# Patient Record
Sex: FEMALE | Race: WHITE | Employment: FULL TIME | URBAN - METROPOLITAN AREA
[De-identification: names, ages, dates, MRNs, and addresses within clinical notes are randomized per-mention and may not be internally consistent; named-entity substitution may affect disease eponyms.]

---

## 2020-10-24 ENCOUNTER — HOSPITAL ENCOUNTER (OUTPATIENT)
Age: 60
Setting detail: OBSERVATION
Discharge: HOME | End: 2020-10-26
Attending: EMERGENCY MEDICINE | Admitting: INTERNAL MEDICINE
Payer: COMMERCIAL

## 2020-10-24 ENCOUNTER — APPOINTMENT (OUTPATIENT)
Dept: CT IMAGING | Age: 60
End: 2020-10-24
Payer: COMMERCIAL

## 2020-10-24 ENCOUNTER — APPOINTMENT (OUTPATIENT)
Dept: GENERAL RADIOLOGY | Age: 60
End: 2020-10-24
Payer: COMMERCIAL

## 2020-10-24 PROBLEM — E86.0 DEHYDRATION: Status: ACTIVE | Noted: 2020-10-24

## 2020-10-24 PROBLEM — E11.9 TYPE 2 DIABETES MELLITUS, WITHOUT LONG-TERM CURRENT USE OF INSULIN (HCC): Status: ACTIVE | Noted: 2020-10-24

## 2020-10-24 PROBLEM — R73.9 HYPERGLYCEMIA: Status: ACTIVE | Noted: 2020-10-24

## 2020-10-24 PROBLEM — R55 SYNCOPE AND COLLAPSE: Status: ACTIVE | Noted: 2020-10-24

## 2020-10-24 PROBLEM — E66.01 MORBID OBESITY WITH BMI OF 40.0-44.9, ADULT (HCC): Status: ACTIVE | Noted: 2020-10-24

## 2020-10-24 LAB
ANION GAP SERPL CALCULATED.3IONS-SCNC: 14 MMOL/L (ref 7–16)
BACTERIA: ABNORMAL /HPF
BASOPHILS ABSOLUTE: 0.05 E9/L (ref 0–0.2)
BASOPHILS RELATIVE PERCENT: 0.6 % (ref 0–2)
BILIRUBIN URINE: NEGATIVE
BLOOD, URINE: NEGATIVE
BUN BLDV-MCNC: 25 MG/DL (ref 8–23)
CALCIUM SERPL-MCNC: 9.2 MG/DL (ref 8.6–10.2)
CHLORIDE BLD-SCNC: 98 MMOL/L (ref 98–107)
CHP ED QC CHECK: YES
CLARITY: CLEAR
CO2: 24 MMOL/L (ref 22–29)
COLOR: YELLOW
CREAT SERPL-MCNC: 0.8 MG/DL (ref 0.5–1)
EKG ATRIAL RATE: 64 BPM
EKG P AXIS: 35 DEGREES
EKG P-R INTERVAL: 132 MS
EKG Q-T INTERVAL: 438 MS
EKG QRS DURATION: 84 MS
EKG QTC CALCULATION (BAZETT): 451 MS
EKG R AXIS: 48 DEGREES
EKG T AXIS: 23 DEGREES
EKG VENTRICULAR RATE: 64 BPM
EOSINOPHILS ABSOLUTE: 0.25 E9/L (ref 0.05–0.5)
EOSINOPHILS RELATIVE PERCENT: 3 % (ref 0–6)
GFR AFRICAN AMERICAN: >60
GFR NON-AFRICAN AMERICAN: >60 ML/MIN/1.73
GLUCOSE BLD-MCNC: 194 MG/DL
GLUCOSE BLD-MCNC: 194 MG/DL (ref 74–99)
GLUCOSE URINE: NEGATIVE MG/DL
HCT VFR BLD CALC: 40.6 % (ref 34–48)
HEMOGLOBIN: 13.2 G/DL (ref 11.5–15.5)
IMMATURE GRANULOCYTES #: 0.04 E9/L
IMMATURE GRANULOCYTES %: 0.5 % (ref 0–5)
KETONES, URINE: NEGATIVE MG/DL
LEUKOCYTE ESTERASE, URINE: ABNORMAL
LYMPHOCYTES ABSOLUTE: 2.48 E9/L (ref 1.5–4)
LYMPHOCYTES RELATIVE PERCENT: 29.4 % (ref 20–42)
MCH RBC QN AUTO: 30.3 PG (ref 26–35)
MCHC RBC AUTO-ENTMCNC: 32.5 % (ref 32–34.5)
MCV RBC AUTO: 93.3 FL (ref 80–99.9)
METER GLUCOSE: 113 MG/DL (ref 74–99)
METER GLUCOSE: 139 MG/DL (ref 74–99)
METER GLUCOSE: 194 MG/DL (ref 74–99)
MONOCYTES ABSOLUTE: 0.76 E9/L (ref 0.1–0.95)
MONOCYTES RELATIVE PERCENT: 9 % (ref 2–12)
NEUTROPHILS ABSOLUTE: 4.86 E9/L (ref 1.8–7.3)
NEUTROPHILS RELATIVE PERCENT: 57.5 % (ref 43–80)
NITRITE, URINE: NEGATIVE
PDW BLD-RTO: 12.9 FL (ref 11.5–15)
PH UA: 6 (ref 5–9)
PLATELET # BLD: 237 E9/L (ref 130–450)
PMV BLD AUTO: 8.8 FL (ref 7–12)
POTASSIUM SERPL-SCNC: 4.3 MMOL/L (ref 3.5–5)
PROTEIN UA: NEGATIVE MG/DL
RBC # BLD: 4.35 E12/L (ref 3.5–5.5)
RBC UA: ABNORMAL /HPF (ref 0–2)
SODIUM BLD-SCNC: 136 MMOL/L (ref 132–146)
SPECIFIC GRAVITY UA: 1.01 (ref 1–1.03)
TROPONIN: <0.01 NG/ML (ref 0–0.03)
UROBILINOGEN, URINE: 0.2 E.U./DL
WBC # BLD: 8.4 E9/L (ref 4.5–11.5)
WBC UA: ABNORMAL /HPF (ref 0–5)

## 2020-10-24 PROCEDURE — 87147 CULTURE TYPE IMMUNOLOGIC: CPT

## 2020-10-24 PROCEDURE — 93005 ELECTROCARDIOGRAM TRACING: CPT | Performed by: STUDENT IN AN ORGANIZED HEALTH CARE EDUCATION/TRAINING PROGRAM

## 2020-10-24 PROCEDURE — 81001 URINALYSIS AUTO W/SCOPE: CPT

## 2020-10-24 PROCEDURE — G0378 HOSPITAL OBSERVATION PER HR: HCPCS

## 2020-10-24 PROCEDURE — 85025 COMPLETE CBC W/AUTO DIFF WBC: CPT

## 2020-10-24 PROCEDURE — 71045 X-RAY EXAM CHEST 1 VIEW: CPT

## 2020-10-24 PROCEDURE — 99285 EMERGENCY DEPT VISIT HI MDM: CPT

## 2020-10-24 PROCEDURE — 70450 CT HEAD/BRAIN W/O DYE: CPT

## 2020-10-24 PROCEDURE — 96361 HYDRATE IV INFUSION ADD-ON: CPT

## 2020-10-24 PROCEDURE — 6370000000 HC RX 637 (ALT 250 FOR IP): Performed by: CLINICAL NURSE SPECIALIST

## 2020-10-24 PROCEDURE — 6360000002 HC RX W HCPCS: Performed by: STUDENT IN AN ORGANIZED HEALTH CARE EDUCATION/TRAINING PROGRAM

## 2020-10-24 PROCEDURE — 99244 OFF/OP CNSLTJ NEW/EST MOD 40: CPT | Performed by: INTERNAL MEDICINE

## 2020-10-24 PROCEDURE — 2580000003 HC RX 258: Performed by: CLINICAL NURSE SPECIALIST

## 2020-10-24 PROCEDURE — 84484 ASSAY OF TROPONIN QUANT: CPT

## 2020-10-24 PROCEDURE — 80048 BASIC METABOLIC PNL TOTAL CA: CPT

## 2020-10-24 PROCEDURE — 87186 SC STD MICRODIL/AGAR DIL: CPT

## 2020-10-24 PROCEDURE — 87088 URINE BACTERIA CULTURE: CPT

## 2020-10-24 PROCEDURE — 96374 THER/PROPH/DIAG INJ IV PUSH: CPT

## 2020-10-24 PROCEDURE — 82962 GLUCOSE BLOOD TEST: CPT

## 2020-10-24 PROCEDURE — 87077 CULTURE AEROBIC IDENTIFY: CPT

## 2020-10-24 PROCEDURE — 6360000002 HC RX W HCPCS: Performed by: CLINICAL NURSE SPECIALIST

## 2020-10-24 PROCEDURE — 96375 TX/PRO/DX INJ NEW DRUG ADDON: CPT

## 2020-10-24 PROCEDURE — 2580000003 HC RX 258: Performed by: STUDENT IN AN ORGANIZED HEALTH CARE EDUCATION/TRAINING PROGRAM

## 2020-10-24 PROCEDURE — 96372 THER/PROPH/DIAG INJ SC/IM: CPT

## 2020-10-24 RX ORDER — DEXTROSE MONOHYDRATE 25 G/50ML
12.5 INJECTION, SOLUTION INTRAVENOUS PRN
Status: DISCONTINUED | OUTPATIENT
Start: 2020-10-24 | End: 2020-10-26 | Stop reason: HOSPADM

## 2020-10-24 RX ORDER — DEXTROSE MONOHYDRATE 50 MG/ML
100 INJECTION, SOLUTION INTRAVENOUS PRN
Status: DISCONTINUED | OUTPATIENT
Start: 2020-10-24 | End: 2020-10-26 | Stop reason: HOSPADM

## 2020-10-24 RX ORDER — HYDROCHLOROTHIAZIDE 25 MG/1
25 TABLET ORAL DAILY
Status: DISCONTINUED | OUTPATIENT
Start: 2020-10-25 | End: 2020-10-26 | Stop reason: HOSPADM

## 2020-10-24 RX ORDER — ACETAMINOPHEN 325 MG/1
650 TABLET ORAL EVERY 6 HOURS PRN
Status: DISCONTINUED | OUTPATIENT
Start: 2020-10-24 | End: 2020-10-26 | Stop reason: HOSPADM

## 2020-10-24 RX ORDER — PROMETHAZINE HYDROCHLORIDE 25 MG/1
12.5 TABLET ORAL EVERY 6 HOURS PRN
Status: DISCONTINUED | OUTPATIENT
Start: 2020-10-24 | End: 2020-10-26

## 2020-10-24 RX ORDER — ACETAMINOPHEN 650 MG/1
650 SUPPOSITORY RECTAL EVERY 6 HOURS PRN
Status: DISCONTINUED | OUTPATIENT
Start: 2020-10-24 | End: 2020-10-26 | Stop reason: HOSPADM

## 2020-10-24 RX ORDER — M-VIT,TX,IRON,MINS/CALC/FOLIC 27MG-0.4MG
1 TABLET ORAL DAILY
COMMUNITY

## 2020-10-24 RX ORDER — SODIUM CHLORIDE 0.9 % (FLUSH) 0.9 %
10 SYRINGE (ML) INJECTION EVERY 12 HOURS SCHEDULED
Status: DISCONTINUED | OUTPATIENT
Start: 2020-10-24 | End: 2020-10-26 | Stop reason: HOSPADM

## 2020-10-24 RX ORDER — VALSARTAN AND HYDROCHLOROTHIAZIDE 320; 25 MG/1; MG/1
1 TABLET, FILM COATED ORAL DAILY
Status: DISCONTINUED | OUTPATIENT
Start: 2020-10-24 | End: 2020-10-24 | Stop reason: SDUPTHER

## 2020-10-24 RX ORDER — LORATADINE 10 MG/1
10 TABLET ORAL DAILY
COMMUNITY

## 2020-10-24 RX ORDER — NICOTINE POLACRILEX 4 MG
15 LOZENGE BUCCAL PRN
Status: DISCONTINUED | OUTPATIENT
Start: 2020-10-24 | End: 2020-10-26 | Stop reason: HOSPADM

## 2020-10-24 RX ORDER — METFORMIN HYDROCHLORIDE 500 MG/1
1000 TABLET, EXTENDED RELEASE ORAL
COMMUNITY

## 2020-10-24 RX ORDER — SODIUM CHLORIDE 0.9 % (FLUSH) 0.9 %
10 SYRINGE (ML) INJECTION PRN
Status: DISCONTINUED | OUTPATIENT
Start: 2020-10-24 | End: 2020-10-26 | Stop reason: HOSPADM

## 2020-10-24 RX ORDER — POLYETHYLENE GLYCOL 3350 17 G/17G
17 POWDER, FOR SOLUTION ORAL DAILY PRN
Status: DISCONTINUED | OUTPATIENT
Start: 2020-10-24 | End: 2020-10-26 | Stop reason: HOSPADM

## 2020-10-24 RX ORDER — 0.9 % SODIUM CHLORIDE 0.9 %
1000 INTRAVENOUS SOLUTION INTRAVENOUS ONCE
Status: COMPLETED | OUTPATIENT
Start: 2020-10-24 | End: 2020-10-24

## 2020-10-24 RX ORDER — VALSARTAN 320 MG/1
320 TABLET ORAL DAILY
Status: DISCONTINUED | OUTPATIENT
Start: 2020-10-25 | End: 2020-10-26 | Stop reason: HOSPADM

## 2020-10-24 RX ORDER — ONDANSETRON 2 MG/ML
4 INJECTION INTRAMUSCULAR; INTRAVENOUS EVERY 6 HOURS PRN
Status: DISCONTINUED | OUTPATIENT
Start: 2020-10-24 | End: 2020-10-26 | Stop reason: HOSPADM

## 2020-10-24 RX ADMIN — VERAPAMIL HYDROCHLORIDE 180 MG: 180 TABLET, FILM COATED, EXTENDED RELEASE ORAL at 16:35

## 2020-10-24 RX ADMIN — SODIUM CHLORIDE 1000 ML: 9 INJECTION, SOLUTION INTRAVENOUS at 10:21

## 2020-10-24 RX ADMIN — ENOXAPARIN SODIUM 40 MG: 40 INJECTION SUBCUTANEOUS at 16:35

## 2020-10-24 RX ADMIN — SODIUM CHLORIDE 1000 ML: 9 INJECTION, SOLUTION INTRAVENOUS at 08:03

## 2020-10-24 RX ADMIN — Medication 10 ML: at 20:41

## 2020-10-24 RX ADMIN — CEFTRIAXONE SODIUM 1 G: 1 INJECTION, POWDER, FOR SOLUTION INTRAMUSCULAR; INTRAVENOUS at 10:18

## 2020-10-24 ASSESSMENT — ENCOUNTER SYMPTOMS
ABDOMINAL PAIN: 0
WHEEZING: 0
RHINORRHEA: 0
SHORTNESS OF BREATH: 0
COUGH: 0
NAUSEA: 1
EYE PAIN: 0
VOMITING: 0
DIARRHEA: 1
PHOTOPHOBIA: 0

## 2020-10-24 ASSESSMENT — PAIN SCALES - GENERAL
PAINLEVEL_OUTOF10: 0
PAINLEVEL_OUTOF10: 0

## 2020-10-24 NOTE — ED NOTES
Bed: 06  Expected date:   Expected time:   Means of arrival:   Comments:  Yair Blackmon RN  10/24/20 0352

## 2020-10-24 NOTE — CONSULTS
Inpatient Cardiology Consultation      Reason for Consult: Syncope    Requesting Physician:  Dr. Nehal Scales    Date of Consultation: 10/24/2020    HISTORY OF PRESENT ILLNESS:   Very pleasant 55-year-old  female on whom we are consulted for syncope    She was in her usual state of health until this morning. She went to the bathroom to urinate. When she went back to bed she sat on the side of the bed and felt very lightheaded then passed out. She did not feel flushed or nauseated. She did not feel palpitations or chest pain or dyspnea. Her  tried to set her up later when she came around and she passed out again. This happened again on route to the hospital.  She refuses to get orthostatic vital signs checked for fear of passing out is again as she feels very lightheaded upon changing her body position or sitting up. Her twelve-lead EKG is entirely normal.  Her telemetry does not reveal any tacky arrhythmia or bradycardia arrhythmia. Her chest x-ray is normal.  Her initial troponin is negative. Her BMP is normal aside from a high BUN to creatinine ratio. Her urine is positive for pyuria. She has no angina during her activities of daily living. She denies orthopnea, lower extremity edema, PND, palpitations. She has never had syncope before. About a week ago she had an upper respiratory infection treated with over-the-counter meds. A focused history review includes:  1. BMI 42  2. Type 2 diabetes  3. Hypertension  4. Former smoker of 30 pack years-quit in 2006  5. No alcohol or recreational drugs  6. Family history negative for premature ASCVD and sudden death  7. Still works at an office    Please note: past medical records were reviewed per electronic medical record - see detailed reports under Past Medical/ Surgical History.    Past Medical History:    Past Medical History:   Diagnosis Date    Diabetes mellitus ([facility] [address]     2    Hypertension        Past Surgical History:    Past Surgical History:   Procedure Laterality Date    HYSTERECTOMY         Medications Prior to admit:  Prior to Admission medications    Medication Sig Start Date End Date Taking?  Authorizing Provider   metFORMIN (GLUCOPHAGE-XR) 500 MG extended release tablet Take 1,000 mg by mouth Daily with supper    Yes Historical Provider, MD   SITagliptin (JANUVIA) 25 MG tablet Take 25 mg by mouth daily   Yes Historical Provider, MD   Multiple Vitamins-Minerals (THERAPEUTIC MULTIVITAMIN-MINERALS) tablet Take 1 tablet by mouth daily   Yes Historical Provider, MD   loratadine (CLARITIN) 10 MG tablet Take 10 mg by mouth daily   Yes Historical Provider, MD   valsartan-hydrochlorothiazide (DIOVAN HCT) 320-25 MG per tablet Take 1 tablet by mouth daily   Yes Historical Provider, MD   verapamil (VERELAN) 180 MG extended release capsule Take 180 mg by mouth daily    Yes Historical Provider, MD       Current Medications:    Current Facility-Administered Medications: verapamil (CALAN SR) extended release tablet 180 mg, 180 mg, Oral, Daily  sodium chloride flush 0.9 % injection 10 mL, 10 mL, Intravenous, 2 times per day  sodium chloride flush 0.9 % injection 10 mL, 10 mL, Intravenous, PRN  acetaminophen (TYLENOL) tablet 650 mg, 650 mg, Oral, Q6H PRN **OR** acetaminophen (TYLENOL) suppository 650 mg, 650 mg, Rectal, Q6H PRN  polyethylene glycol (GLYCOLAX) packet 17 g, 17 g, Oral, Daily PRN  promethazine (PHENERGAN) tablet 12.5 mg, 12.5 mg, Oral, Q6H PRN **OR** ondansetron (ZOFRAN) injection 4 mg, 4 mg, Intravenous, Q6H PRN  enoxaparin (LOVENOX) injection 40 mg, 40 mg, Subcutaneous, Daily  insulin lispro (HUMALOG) injection vial 0-6 Units, 0-6 Units, Subcutaneous, TID WC  insulin lispro (HUMALOG) injection vial 0-3 Units, 0-3 Units, Subcutaneous, Nightly  glucose (GLUTOSE) 40 % oral gel 15 g, 15 g, Oral, PRN  dextrose 50 % IV solution, 12.5 g, Intravenous, PRN  glucagon (rDNA) injection 1 mg, 1 mg, Intramuscular, PRN  dextrose 5 % solution, 100 visual changes, or   Respiratory ROS: negative for - cough, hemoptysis, orthopnea, shortness of breath, wheezing, or   Cardiovascular ROS: see HPI  Gastrointestinal ROS: negative for - abdominal pain, blood in stools, constipation, diarrhea, heartburn, hematemesis, melena, nausea/vomiting, or   Genito-Urinary ROS: no dysuria, trouble voiding, or hematuria or   Musculoskeletal ROS: negative for - joint pain, joint swelling, muscle pain, muscular weakness, or   Neurological ROS: negative for - confusion, dizziness, headaches, impaired coordination/balance, memory loss, numbness/tingling, seizures, speech problems, visual changes, weakness, or    Dermatological ROS: negative for - hair changes, nail changes, pruritus, or rash    PHYSICAL EXAM:  /60   Pulse 60   Temp 96.9 °F (36.1 °C) (Temporal)   Resp 18   Ht 5' 1.5\" (1.562 m)   Wt 228 lb (103.4 kg)   SpO2 97%   BMI 42.38 kg/m²     General Appearance:    Alert, cooperative, no distress, appears stated age    Head:    Normocephalic, without obvious abnormality, atraumatic    Eyes:    PERRL, conjunctiva/corneas clear, EOM's intact    Neck:   Supple, symmetrical, trachea midline; no carotid    bruit or JVP    Lungs:     Clear to auscultation bilaterally, respirations unlabored, no wheezing, rales or rhonchi    Heart:    Regular rate and rhythm, no murmur, rub   or gallop    Abdomen:     Soft, non-tender, non-distended    Extremities:   Extremities normal, atraumatic, no cyanosis or edema    Skin:   Skin color, texture, turgor normal for age    Neurologic:   Oriented x3, CNII-XII intact.  Normal gross strength and sensation       DATA:    ECG / Tele strips: please see HPI       No intake or output data in the 24 hours ending 10/24/20 1507    Labs:   CBC:   Recent Labs     10/24/20  0806   WBC 8.4   HGB 13.2   HCT 40.6        BMP:   Recent Labs     10/24/20  0806      K 4.3   CO2 24   BUN 25*   CREATININE 0.8   LABGLOM >60   CALCIUM 9.2     Mag: No results for input(s): MG in the last 72 hours. Phos: No results for input(s): PHOS in the last 72 hours. TSH: No results for input(s): TSH in the last 72 hours. HgA1c: No results found for: LABA1C  No results found for: EAG  proBNP: No results for input(s): PROBNP in the last 72 hours. PT/INR: No results for input(s): PROTIME, INR in the last 72 hours. APTT:No results for input(s): APTT in the last 72 hours. CARDIAC ENZYMES:  Recent Labs     10/24/20  0806   TROPONINI <0.01     FASTING LIPID PANEL:No results found for: CHOL, HDL, LDLDIRECT, LDLCALC, TRIG  LIVER PROFILE:No results for input(s): AST, ALT, LABALBU in the last 72 hours. ASSESSMENT:  58-year-old  female with type 2 diabetes, hypertension who presents with syncope. Her syncope appears to be very positional such as sitting up or changing position in bed. She does not have vertigo. She does not have ACS or heart failure. She has no evidence of arrhythmia on twelve-lead EKG or telemetry since she has been in the hospital.  Her labs are unremarkable aside from a high BUN to creatinine ratio. The differential diagnosis includes vertebrobasilar insufficiency, arrhythmia, hypovolemia. I have low suspicion of structural heart disease (completely normal exam), PE or coronary ischemia. PLAN:   Limited TTE for LV function and RV function   Agree with carotid ultrasound. Low threshold for CTA or MRA of the head and neck   If no significant carotid or vertebral disease then recommend an ENT consult   If work-up is negative then recommend a 30-day event monitor on discharge   Follow-up with me in the office in 4 weeks    We will follow this patient peripherally. Please call us with any questions or concerns. Thank you for the consult.     Sunny Bruce MD  Interventional Cardiology/Structural Heart Disease  Cell: [phone]       Electronically signed by Sunny Bruce MD on 10/24/2020 at 3:07 PM

## 2020-10-24 NOTE — ED PROVIDER NOTES
Jamia Pierson is a 61 y.o. female presenting to the ED for dizziness, syncope. Patient was brought by EMS and reports that patient in route was set up in the bed and syncopized for 10 seconds. She currently is alert and oriented in room now. Patient reports that this morning she got up went to the bathroom and came back to her bed and then sat up and felt dizzy. Her dizziness described as lightheaded and she felt nauseous at that time. She denies any history of similar presentation. She has history of diabetes and her sugars this morning when she woke up around 230. She admits to a dry mouth. The complaint has been intermittent, moderate in severity, and worsened by sitting up. Their complaint is made better by nothing. Patient has a medical history as listed below:   Past Medical History:   Diagnosis Date    Diabetes mellitus ([facility] [address]     2    Hypertension      Patient Active Problem List    Diagnosis Date Noted    Dehydration 10/24/2020    Morbid obesity with BMI of 40.0-44.9, adult ([facility] [address] 10/24/2020    Type 2 diabetes mellitus, without long-term current use of insulin ([facility] [address] 10/24/2020    Syncope and collapse 10/24/2020             Review of Systems   Constitutional: Negative for chills and fever. HENT: Negative for congestion and rhinorrhea. Dry mouth   Eyes: Positive for visual disturbance (Darkening of vision before she syncopized). Negative for photophobia and pain. Respiratory: Negative for cough, shortness of breath and wheezing. Cardiovascular: Negative for chest pain, palpitations and leg swelling. Gastrointestinal: Positive for diarrhea and nausea. Negative for abdominal pain and vomiting. Genitourinary: Negative for dysuria, frequency and hematuria. Skin: Negative. Neurological: Positive for dizziness, syncope and light-headedness. Negative for facial asymmetry, weakness, numbness and headaches. Hematological: Negative. Psychiatric/Behavioral: Negative. Physical Exam  Vitals signs and nursing note reviewed. Constitutional:       Appearance: She is well-developed. HENT:      Head: Normocephalic and atraumatic. Right Ear: Tympanic membrane, ear canal and external ear normal.      Left Ear: Tympanic membrane, ear canal and external ear normal.      Nose: Nose normal.      Mouth/Throat:      Mouth: Mucous membranes are dry. Pharynx: No oropharyngeal exudate or posterior oropharyngeal erythema. Eyes:      Extraocular Movements: Extraocular movements intact. Conjunctiva/sclera: Conjunctivae normal.      Pupils: Pupils are equal, round, and reactive to light. Neck:      Musculoskeletal: Normal range of motion. Vascular: No JVD. Cardiovascular:      Rate and Rhythm: Normal rate and regular rhythm. Pulses: Normal pulses. Heart sounds: Normal heart sounds. Pulmonary:      Effort: Pulmonary effort is normal. No respiratory distress. Breath sounds: Normal breath sounds. No wheezing, rhonchi or rales. Abdominal:      General: Abdomen is flat. There is no distension. Palpations: Abdomen is soft. There is no mass. Tenderness: There is no abdominal tenderness. There is no guarding or rebound. Musculoskeletal:         General: No swelling or deformity. Right lower leg: No edema. Left lower leg: No edema. Skin:     General: Skin is warm and dry. Neurological:      General: No focal deficit present. Mental Status: She is alert and oriented to person, place, and time. Cranial Nerves: No cranial nerve deficit. Sensory: No sensory deficit. Motor: No weakness. Coordination: Coordination normal.          Procedures     [facility]     ED Course as of Oct 24 2117   Sat Oct 24, 2020   0949 Leukocyte Esterase, Urine(!): SMALL [WL]   0950 Bacteria, UA(!): MODERATE [WL]   1201 Patient reevaluated bedside and remains to feel dizzy.     [WL]   3615 Spoke with [facility] physician, they agreed to admit the fL    Platelets 642 124 - 199 E9/L    MPV 8.8 7.0 - 12.0 fL    Neutrophils % 57.5 43.0 - 80.0 %    Immature Granulocytes % 0.5 0.0 - 5.0 %    Lymphocytes % 29.4 20.0 - 42.0 %    Monocytes % 9.0 2.0 - 12.0 %    Eosinophils % 3.0 0.0 - 6.0 %    Basophils % 0.6 0.0 - 2.0 %    Neutrophils Absolute 4.86 1.80 - 7.30 E9/L    Immature Granulocytes # 0.04 E9/L    Lymphocytes Absolute 2.48 1.50 - 4.00 E9/L    Monocytes Absolute 0.76 0.10 - 0.95 E9/L    Eosinophils Absolute 0.25 0.05 - 0.50 E9/L    Basophils Absolute 0.05 0.00 - 0.20 N8/M   Basic Metabolic Panel   Result Value Ref Range    Sodium 136 132 - 146 mmol/L    Potassium 4.3 3.5 - 5.0 mmol/L    Chloride 98 98 - 107 mmol/L    CO2 24 22 - 29 mmol/L    Anion Gap 14 7 - 16 mmol/L    Glucose 194 (H) 74 - 99 mg/dL    BUN 25 (H) 8 - 23 mg/dL    CREATININE 0.8 0.5 - 1.0 mg/dL    GFR Non-African American >60 >=60 mL/min/1.73    GFR African American >60     Calcium 9.2 8.6 - 10.2 mg/dL   Troponin   Result Value Ref Range    Troponin <0.01 0.00 - 0.03 ng/mL   Urinalysis with Microscopic   Result Value Ref Range    Color, UA Yellow Straw/Yellow    Clarity, UA Clear Clear    Glucose, Ur Negative Negative mg/dL    Bilirubin Urine Negative Negative    Ketones, Urine Negative Negative mg/dL    Specific Gravity, UA 1.010 1.005 - 1.030    Blood, Urine Negative Negative    pH, UA 6.0 5.0 - 9.0    Protein, UA Negative Negative mg/dL    Urobilinogen, Urine 0.2 <2.0 E.U./dL    Nitrite, Urine Negative Negative    Leukocyte Esterase, Urine SMALL (A) Negative    WBC, UA 2-5 0 - 5 /HPF    RBC, UA 1-3 0 - 2 /HPF    Bacteria, UA MODERATE (A) None Seen /HPF   POCT Glucose   Result Value Ref Range    Glucose 194 mg/dL    QC OK?  yes    POCT Glucose   Result Value Ref Range    Meter Glucose 194 (H) 74 - 99 mg/dL   POCT Glucose   Result Value Ref Range    Meter Glucose 113 (H) 74 - 99 mg/dL   POCT Glucose   Result Value Ref Range    Meter Glucose 139 (H) 74 - 99 mg/dL   EKG 12 Lead   Result Value Ref Range    Ventricular Rate 64 BPM    Atrial Rate 64 BPM    P-R Interval 132 ms    QRS Duration 84 ms    Q-T Interval 438 ms    QTc Calculation (Bazett) 451 ms    P Axis 35 degrees    R Axis 48 degrees    T Axis 23 degrees       RADIOLOGY:  CT HEAD WO CONTRAST   Final Result   No acute intracranial abnormality is identified. XR CHEST PORTABLE   Final Result   No acute process. US CAROTID ARTERY BILATERAL    (Results Pending)             ------------------------- NURSING NOTES AND VITALS REVIEWED ---------------------------  Date / Time Roomed:  10/24/2020  7:28 AM  ED Bed Assignment:  [id number]    The nursing notes within the ED encounter and vital signs as below have been reviewed.      Patient Vitals for the past 24 hrs:   BP Temp Temp src Pulse Resp SpO2 Height Weight   10/24/20 1945 127/69 97.7 °F (36.5 °C) Temporal 65 18 97 % -- --   10/24/20 1500 130/60 96.9 °F (36.1 °C) Temporal 60 18 97 % 5' 1.5\" (1.562 m) 228 lb (103.4 kg)   10/24/20 1400 -- -- -- -- 18 -- -- --   10/24/20 1345 -- -- -- 64 24 96 % -- --   10/24/20 1330 (!) 144/70 -- -- 69 21 97 % -- --   10/24/20 1315 -- -- -- 65 18 97 % -- --   10/24/20 1300 (!) 161/81 -- -- 64 24 98 % -- --   10/24/20 1245 -- -- -- 59 17 97 % -- --   10/24/20 1230 139/61 -- -- 66 26 98 % -- --   10/24/20 1215 -- -- -- 65 26 96 % -- --   10/24/20 1200 139/71 -- -- 62 20 96 % -- --   10/24/20 1145 -- -- -- 58 18 96 % -- --   10/24/20 1130 133/81 -- -- 63 15 97 % -- --   10/24/20 1115 -- -- -- 63 21 97 % -- --   10/24/20 1100 (!) 150/80 -- -- 61 16 96 % -- --   10/24/20 1045 -- -- -- 69 16 93 % -- --   10/24/20 1030 126/64 -- -- 63 16 97 % -- --   10/24/20 0845 -- -- -- 70 19 97 % -- --   10/24/20 0738 (!) 157/84 96.9 °F (36.1 °C) Infrared 68 18 96 % 5' 1.5\" (1.562 m) 220 lb (99.8 kg)           Counseling:  I have spoken with the patient/family members and discussed todays results, in addition to providing specific details for the plan of care and counseling regarding the diagnosis and prognosis. Their questions are answered at this time and they are agreeable with the plan of admission. This patient has remained hemodynamically stable during their ED course. Diagnosis:  1. Syncope and collapse    2. Orthostatic lightheadedness    3. Acute cystitis with hematuria        Disposition:  Patient's disposition: Admit  Patient's condition is stable. NOTE:  This report was transcribed using voice recognition software. Efforts were made to ensure accuracy; however, inadvertent computerized transcription errors may be present.         Parisa Morris DO  Resident  10/24/20 1842

## 2020-10-24 NOTE — ACP (ADVANCE CARE PLANNING)
ADVANCED CARE PLANNING    Patient Name: Ariadne Guillen       YOB: 1960              MRN:    [id number]  Admission Date:  10/24/2020  7:28 AM    Active Diagnoses:  Principal Problem:    Dehydration  Active Problems: Morbid obesity with BMI of 40.0-44.9, adult (HCC)    Type 2 diabetes mellitus, without long-term current use of insulin [facility]  Resolved Problems:    * No resolved hospital problems. *      These active diagnoses are of sufficient risk that focused discussion on advanced care planning is indicated in order to allow the patient to thoughtfully consider personal goals of care; and, if situations arise that prevent the patient to personally give input, to ensure appropriate representation of their personal desire for different levels and levels of care. Persons present in discussion: Ariadne AzevedoauvaisKaelyn APRN - CNS, Family members: none. Discussion: I reviewed her admission for syncope and collapse and her desires for ongoing aggressive care, including potential intubation and mechanical ventilation; also discussed who would speak on her behalf should she be unable to do so and discussed what conversations she has had with her family so they understand her desires if such a situation occurred now or in the future. I presented and explained the availability of our palliative care team to her, including the availability of advanced directives forms which she can review with her family and then fill out if they so wish. She wishes to remain a full code at the present time. Time Spent on Advanced Planning Documents: 30 minutes    Electronically signed by CLAUDIO Razo on 10/24/2020 at 2:39 PM    NOTE: This report was transcribed using voice recognition software. Every effort was made to ensure accuracy; however, inadvertent computerized transcription errors may be present.

## 2020-10-24 NOTE — ED NOTES
Dr. Chaparro Brown said orthos do not need to be performed. Pt states \"I get dizzy as soon as I sit up. \"     Julieth Abrams RN  10/24/20 1346 [facility]  10/24/20 2896

## 2020-10-24 NOTE — H&P
Hospital Medicine History & Physical      PCP: Debbie Andrew DO    Date of Admission: 10/24/2020    Date of Service: Pt seen/examined on 10/24/20 Placed in Observation. Chief Complaint:  syncope      History Of Present Illness:      61 y.o. female with history of DM and HTN who presented to [facility] with syncope. History obtained from the patient. She states she woke this morning, got up to go to the bathroom and upon returning to bed, became dizzy. She states she lied back down and when she attempted to get out of bed again, she sat up and passed out. She states this happened twice before she told her  that something was wrong. He checked her BGL and BP then called EMS. She states she passed out again when trying to get up to walk to the cot. In the ED, she received 2 L IV NS. She states she is still feeling dizzy when she sits up. She denies having any symptoms prior to today. States she drinks two very large cups of water a day. Denies urinary symptoms. States her blood sugars have been a little high but not terrible. She denies palpitations, fluttering in her chest, vision changes, fever or chills. 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 Orthostatics not completed in the ED d/t patient having dizziness with position changes. In addition the the 2 L bolus, she received rocephin for possible UTI. EKG is NSR with no acute changes. Past Medical History:          Diagnosis Date    Diabetes mellitus ([facility] [address]     2    Hypertension        Past Surgical History:          Procedure Laterality Date    HYSTERECTOMY         Medications Prior to Admission:      Prior to Admission medications    Medication Sig Start Date End Date Taking?  Authorizing Provider   metFORMIN (GLUCOPHAGE-XR) 500 MG extended release tablet Take 1,000 mg by mouth Daily with supper    Yes Historical Provider, MD   SITagliptin (JANUVIA) 25 MG tablet Take 25 mg by mouth daily   Yes Historical Provider, MD   Multiple Vitamins-Minerals (THERAPEUTIC MULTIVITAMIN-MINERALS) tablet Take 1 tablet by mouth daily   Yes Historical Provider, MD   loratadine (CLARITIN) 10 MG tablet Take 10 mg by mouth daily   Yes Historical Provider, MD   valsartan-hydrochlorothiazide (DIOVAN HCT) 320-25 MG per tablet Take 1 tablet by mouth daily   Yes Historical Provider, MD   verapamil (VERELAN) 180 MG extended release capsule Take 180 mg by mouth daily    Yes Historical Provider, MD       Allergies:  Phenergan [promethazine] and Valsartan    Social History:      The patient currently lives at home. TOBACCO:   reports that she has quit smoking. She quit smokeless tobacco use about 16 years ago. ETOH:   reports current alcohol use. Family History:      Positive as follows:        Problem Relation Age of Onset    Cancer Father     Heart Disease Father     Diabetes Father        REVIEW OF SYSTEMS:   Pertinent positives as noted in the HPI. All other systems reviewed and negative. PHYSICAL EXAM:    BP (!) 144/70   Pulse 64   Temp 96.9 °F (36.1 °C) (Infrared)   Resp 18   Ht 5' 1.5\" (1.562 m)   Wt 220 lb (99.8 kg)   SpO2 96%   BMI 40.90 kg/m²     General appearance:  No apparent distress, appears stated age and cooperative. HEENT:  Normal cephalic, atraumatic without obvious deformity. Pupils equal, round, and reactive to light. Extra ocular muscles intact. Conjunctivae/corneas clear. Neck: Supple, with full range of motion. No jugular venous distention. Trachea midline. Respiratory:  Normal respiratory effort. Clear to auscultation, bilaterally without Rales/Wheezes/Rhonchi. Cardiovascular:  Regular rate and rhythm with normal S1/S2 without murmurs, rubs or gallops. Abdomen: Soft, non-tender, non-distended with normal bowel sounds.   Musculoskeletal:  No clubbing, cyanosis or edema bilaterally. Full range of motion without deformity. Skin: Skin color, texture, turgor normal.  No rashes or lesions. Neurologic:  Neurovascularly intact without any focal sensory/motor deficits. Psychiatric:  Alert and oriented, thought content appropriate, normal insight  Capillary Refill: Brisk,< 3 seconds   Peripheral Pulses: +2 palpable, equal bilaterally         EKG:  I have reviewed the EKG with the following interpretation: NSR    Labs:     Recent Labs     10/24/20  0806   WBC 8.4   HGB 13.2   HCT 40.6        Recent Labs     10/24/20  0806      K 4.3   CL 98   CO2 24   BUN 25*   CREATININE 0.8   CALCIUM 9.2     No results for input(s): AST, ALT, BILIDIR, BILITOT, ALKPHOS in the last 72 hours. No results for input(s): INR in the last 72 hours. Recent Labs     10/24/20  0806   TROPONINI <0.01       Urinalysis:      Lab Results   Component Value Date    NITRU Negative 10/24/2020    WBCUA 2-5 10/24/2020    BACTERIA MODERATE 10/24/2020    RBCUA 1-3 10/24/2020    BLOODU Negative 10/24/2020    SPECGRAV 1.010 10/24/2020    GLUCOSEU Negative 10/24/2020     CT HEAD WO CONTRAST   Final Result   No acute intracranial abnormality is identified. XR CHEST PORTABLE   Final Result   No acute process.                ASSESSMENT:    Active Hospital Problems    Diagnosis Date Noted    Dehydration [E86.0] 10/24/2020    Morbid obesity with BMI of 40.0-44.9, adult ([facility] [address] [E66.01, Z68.41] 10/24/2020    Type 2 diabetes mellitus, without long-term current use of insulin ([facility] [address] [E11.9] 10/24/2020    Syncope and collapse [R55] 10/24/2020       PLAN:  Resume home medications  Daily weights  I/Os  BGL management-ISS  BP management  Consult cardiology  US carotid  Echo      DVT Prophylaxis: lovenox  Diet: No diet orders on file  Code Status: No Order    PT/OT Eval Status: n/a    Dispo - observation telemetry       Nancie Christian CNP    Thank you Maryam Escobar DO for the opportunity to be involved in this patient's care. If you have any questions or concerns please feel free to contact me via [facility].

## 2020-10-25 ENCOUNTER — APPOINTMENT (OUTPATIENT)
Dept: ULTRASOUND IMAGING | Age: 60
End: 2020-10-25
Payer: COMMERCIAL

## 2020-10-25 LAB
ANION GAP SERPL CALCULATED.3IONS-SCNC: 13 MMOL/L (ref 7–16)
BUN BLDV-MCNC: 18 MG/DL (ref 8–23)
CALCIUM SERPL-MCNC: 8.6 MG/DL (ref 8.6–10.2)
CHLORIDE BLD-SCNC: 105 MMOL/L (ref 98–107)
CO2: 23 MMOL/L (ref 22–29)
CREAT SERPL-MCNC: 0.8 MG/DL (ref 0.5–1)
GFR AFRICAN AMERICAN: >60
GFR NON-AFRICAN AMERICAN: >60 ML/MIN/1.73
GLUCOSE BLD-MCNC: 158 MG/DL (ref 74–99)
METER GLUCOSE: 155 MG/DL (ref 74–99)
METER GLUCOSE: 157 MG/DL (ref 74–99)
METER GLUCOSE: 203 MG/DL (ref 74–99)
POTASSIUM REFLEX MAGNESIUM: 4.1 MMOL/L (ref 3.5–5)
SODIUM BLD-SCNC: 141 MMOL/L (ref 132–146)

## 2020-10-25 PROCEDURE — 96372 THER/PROPH/DIAG INJ SC/IM: CPT

## 2020-10-25 PROCEDURE — 93880 EXTRACRANIAL BILAT STUDY: CPT | Performed by: RADIOLOGY

## 2020-10-25 PROCEDURE — 93880 EXTRACRANIAL BILAT STUDY: CPT

## 2020-10-25 PROCEDURE — 36415 COLL VENOUS BLD VENIPUNCTURE: CPT

## 2020-10-25 PROCEDURE — 2580000003 HC RX 258: Performed by: CLINICAL NURSE SPECIALIST

## 2020-10-25 PROCEDURE — 82962 GLUCOSE BLOOD TEST: CPT

## 2020-10-25 PROCEDURE — G0378 HOSPITAL OBSERVATION PER HR: HCPCS

## 2020-10-25 PROCEDURE — 80048 BASIC METABOLIC PNL TOTAL CA: CPT

## 2020-10-25 PROCEDURE — 6360000002 HC RX W HCPCS: Performed by: CLINICAL NURSE SPECIALIST

## 2020-10-25 PROCEDURE — 6370000000 HC RX 637 (ALT 250 FOR IP): Performed by: CLINICAL NURSE SPECIALIST

## 2020-10-25 RX ADMIN — Medication 10 ML: at 07:54

## 2020-10-25 RX ADMIN — VERAPAMIL HYDROCHLORIDE 180 MG: 180 TABLET, FILM COATED, EXTENDED RELEASE ORAL at 07:53

## 2020-10-25 RX ADMIN — INSULIN LISPRO 1 UNITS: 100 INJECTION, SOLUTION INTRAVENOUS; SUBCUTANEOUS at 20:45

## 2020-10-25 RX ADMIN — HYDROCHLOROTHIAZIDE 25 MG: 25 TABLET ORAL at 07:54

## 2020-10-25 RX ADMIN — Medication 10 ML: at 20:47

## 2020-10-25 RX ADMIN — ENOXAPARIN SODIUM 40 MG: 40 INJECTION SUBCUTANEOUS at 07:52

## 2020-10-25 RX ADMIN — VALSARTAN 320 MG: 320 TABLET, FILM COATED ORAL at 07:54

## 2020-10-25 RX ADMIN — ACETAMINOPHEN 650 MG: 325 TABLET ORAL at 16:12

## 2020-10-25 ASSESSMENT — PAIN SCALES - GENERAL
PAINLEVEL_OUTOF10: 0
PAINLEVEL_OUTOF10: 2
PAINLEVEL_OUTOF10: 0
PAINLEVEL_OUTOF10: 3

## 2020-10-25 NOTE — CONSULTS
OTOLARYNGOLOGY  CONSULT NOTE  10/25/2020    Physician Consulted: Dr. Dena Gao  Reason for Consult: dizziness  Referring Physician: Dr. Clifton Dunn is a 61 y.o. female who presents for evaluation of dizziness. Patient reports yesterday syncopal episode. She woke out of bed went from sitting to standing became dizzy and had syncopal episode. Since she states every time she rolled over in bed or went from laying to sitting she describes dizziness as room spinning sensation that lasts for few seconds and resolves with rest, associated with nausea. Denies headache blurry vision change in vision change in hearing tinnitus. Denies previous similar hx. Upon arrival, noted to have increased BUN, dehydrated, pyuria on UA. Denies recent medication changes. She does have seasonal allergies and sinus pressure she controls with claritin     She had myringotomy tubes placed as child and few years ago as adult otherwise no surgeries to the head and neck. Former smoker- quit 14 years ago. Past Medical History:   Diagnosis Date    Diabetes mellitus ([facility] [address]     2    Hypertension        Past Surgical History:   Procedure Laterality Date    HYSTERECTOMY         Medications Prior to Admission:    Prior to Admission medications    Medication Sig Start Date End Date Taking?  Authorizing Provider   metFORMIN (GLUCOPHAGE-XR) 500 MG extended release tablet Take 1,000 mg by mouth Daily with supper    Yes Historical Provider, MD   SITagliptin (JANUVIA) 25 MG tablet Take 25 mg by mouth daily   Yes Historical Provider, MD   Multiple Vitamins-Minerals (THERAPEUTIC MULTIVITAMIN-MINERALS) tablet Take 1 tablet by mouth daily   Yes Historical Provider, MD   loratadine (CLARITIN) 10 MG tablet Take 10 mg by mouth daily   Yes Historical Provider, MD   valsartan-hydrochlorothiazide (DIOVAN HCT) 320-25 MG per tablet Take 1 tablet by mouth daily   Yes Historical Provider, MD   verapamil (VERELAN) 180 MG extended release capsule Take 180 mg by mouth daily    Yes Historical Provider, MD       Allergies   Allergen Reactions    Phenergan [Promethazine] Other (See Comments)     Unknown     Valsartan Palpitations and Other (See Comments)     Skin crawls        Family History   Problem Relation Age of Onset    Cancer Father     Heart Disease Father     Diabetes Father        Social History     Tobacco Use    Smoking status: Former Smoker    Smokeless tobacco: Former User     Quit date: 2/22/2004   Substance Use Topics    Alcohol use: Yes     Comment: rare    Drug use: No         Review of Systems   General ROS: negative  Hematological and Lymphatic ROS: negative  Respiratory ROS: no cough, shortness of breath, or wheezing  Cardiovascular ROS: no chest pain or dyspnea on exertion  Gastrointestinal ROS: no abdominal pain, change in bowel habits, or black or bloody stools  Genito-Urinary ROS: no dysuria, trouble voiding, or hematuria  Musculoskeletal ROS: negative      PHYSICAL EXAM:    Vitals:    10/25/20 0745   BP: (!) 143/69   Pulse: 65   Resp: 18   Temp: 97.6 °F (36.4 °C)   SpO2: 95%       General Appearance:  Laying bed, awake, alert, no apparent distress  Head/face:  NC/AT  Eyes: PERRL, EOMI  ENT: external ears normal, TMs intact without effusion or infection, myringosclerosis to left TM, nares patent, dry nasal mucosa, oral cavity clear, neck soft non tender no lymphadenopathy  Lungs:  Non-labored, good respiratory effort, no stridor  Heart:  RR    Whitefield hallpike:  LEFT: (negative)   RIGHT: (negative)  Epley was not performed      LABS:     CBC  Recent Labs     10/24/20  0806   WBC 8.4   HGB 13.2   HCT 40.6        BMP  Recent Labs     10/25/20  0545      K 4.1      CO2 23   BUN 18   CREATININE 0.8   CALCIUM 8.6     COAG's  No results for input(s): INR, PTT in the last 72 hours.     Invalid input(s): PT  CALCIUM  Recent Labs     10/24/20  0806 10/25/20  0545   CALCIUM 9.2 8.6     PTH  No results for input(s): PTH in the last 72 hours. RADIOLOGY    Ct Head Wo Contrast    Result Date: 10/24/2020  EXAMINATION: CT OF THE HEAD WITHOUT CONTRAST  10/24/2020 9:13 am TECHNIQUE: CT of the head was performed without the administration of intravenous contrast. Dose modulation, iterative reconstruction, and/or weight based adjustment of the mA/kV was utilized to reduce the radiation dose to as low as reasonably achievable. COMPARISON: 02/22/2018 HISTORY: ORDERING SYSTEM PROVIDED HISTORY: syncope TECHNOLOGIST PROVIDED HISTORY: Has a \"code stroke\" or \"stroke alert\" been called? ->No Reason for exam:->syncope What reading provider will be dictating this exam?->CRC FINDINGS: BRAIN/VENTRICLES: There is no acute intracranial hemorrhage, mass effect or midline shift. No abnormal extra-axial fluid collection. The gray-white differentiation is maintained without evidence of an acute infarct. There is no evidence of hydrocephalus. No wedge-shaped area of acute ischemia is identified. No intracranial mass is identified. No basilar cistern or sulcal effacement is identified. Minimal atherosclerotic plaque noted within the left vertebral artery. Tiny lacunar infarct noted adjacent to the frontal horn of the left lateral ventricle. ORBITS: The visualized portion of the orbits demonstrate no acute abnormality. SINUSES: The visualized paranasal sinuses and mastoid air cells demonstrate no acute abnormality. SOFT TISSUES/SKULL:  No acute abnormality of the visualized skull or soft tissues. No acute intracranial abnormality is identified. Xr Chest Portable    Result Date: 10/24/2020  EXAMINATION: ONE XRAY VIEW OF THE CHEST 10/24/2020 8:14 am COMPARISON: None. HISTORY: ORDERING SYSTEM PROVIDED HISTORY: syncope TECHNOLOGIST PROVIDED HISTORY: Reason for exam:->syncope What reading provider will be dictating this exam?->CRC Initial exam FINDINGS: The lungs are without acute focal process. There is no effusion or pneumothorax.  The cardiomediastinal silhouette is without acute process. The osseous structures are without acute process. No acute process. Us Carotid Artery Bilateral    Result Date: 10/25/2020  Patient MRN:  [id number] : 1960 Age: 61 years Gender: Female Order Date:  10/25/2020 9:10 AM EXAM: US CAROTID ARTERY BILATERAL NUMBER OF IMAGES:  55 INDICATION:  syncope syncope What reading provider will be dictating this exam?->[facility] COMPARISON: None FINDINGS: Velocities are within normal limits ICA\CCA ratios are  within normal limits The right vertebral artery doppler images demonstrate  antegrade flow. The left vertebral artery doppler images demonstrate  antegrade flow. Grey scale images demonstrate mild plaque identified in the right and left carotid arteries. Atherosclerotic disease. No hemodynamically significant stenosis is identified Estimated stenosis by NASCET criteria in the proximal right carotid artery is between 0% and 49%. Estimated stenosis by NASCET criteria in the proximal left carotid artery is between 0% and 49%. ASSESSMENT:  61 y.o. female with dizziness, UTI, syncopal episode. Khurram hallpike negative. Low suspicion this is from the inner ear     PLAN:  -pt can follow up with Dr. Jocelyne Denson outpatient for re-evaluation ,possible VNG if still having room-spinning vertigo  -stay well hydrated  -advised patient to limit head movements  -medical management per medical team    Patient discussed with Attending.      Electronically signed by Hafsa Ray DO on 10/25/20 at 1:34 PM EDT

## 2020-10-25 NOTE — PLAN OF CARE
Problem: Cardiac Output - Decreased:  Goal: Hemodynamic stability will improve  Description: Hemodynamic stability will improve  10/25/2020 0212 by Gagandeep Mathur  Outcome: Met This Shift  10/24/2020 1513 by Doris Vázquez RN  Outcome: Met This Shift

## 2020-10-25 NOTE — PROGRESS NOTES
Hospitalist Progress Note      PCP: Gonzalez Watts DO    Date of Admission: 10/24/2020    Chief Complaint: syncope    Hospital Course:   61 y.o. female with history of DM and HTN who presented to [address] [place] [address] with syncope. History obtained from the patient. She states she woke this morning, got up to go to the bathroom and upon returning to bed, became dizzy. She states she lied back down and when she attempted to get out of bed again, she sat up and passed out. She states this happened twice before she told her  that something was wrong. He checked her BGL and BP then called EMS. She states she passed out again when trying to get up to walk to the cot. In the ED, she received 2 L IV NS. She states she is still feeling dizzy when she sits up. She denies having any symptoms prior to today. States she drinks two very large cups of water a day. Denies urinary symptoms. States her blood sugars have been a little high but not terrible. She denies palpitations, fluttering in her chest, vision changes, fever or chills. 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 Orthostatics not completed in the ED d/t patient having dizziness with position changes. In addition the the 2 L bolus, she received rocephin for possible UTI. EKG is NSR with no acute changes. 10/25: for carotid US and Echo. Will consult ENT if US negative. May require 30 day event monitor at discharge if Echo and US negative. Subjective:   Patient sleeping, wakes easily. She states she is dizzy with any movement today, not just sitting up. She denies chest pain, shortness of breath or fever.      Medications:  Reviewed    Infusion Medications    dextrose       Scheduled Medications    verapamil  180 mg Oral Daily    sodium chloride flush  10 mL Intravenous 2 times per day    enoxaparin  40 mg Subcutaneous Daily    insulin lispro  0-6 Units Subcutaneous TID     insulin lispro  0-3 Units Subcutaneous Nightly    valsartan  320 mg Oral Daily    And    hydroCHLOROthiazide  25 mg Oral Daily     PRN Meds: sodium chloride flush, acetaminophen **OR** acetaminophen, polyethylene glycol, promethazine **OR** ondansetron, glucose, dextrose, glucagon (rDNA), dextrose, perflutren lipid microspheres      Intake/Output Summary (Last 24 hours) at 10/25/2020 1027  Last data filed at 10/25/2020 0857  Gross per 24 hour   Intake 720 ml   Output 400 ml   Net 320 ml       Exam:    BP (!) 143/69   Pulse 65   Temp 97.6 °F (36.4 °C) (Temporal)   Resp 18   Ht 5' 1.5\" (1.562 m)   Wt 222 lb (100.7 kg)   SpO2 95%   BMI 41.27 kg/m²     General appearance: No apparent distress, appears stated age and cooperative. HEENT: Pupils equal, round, and reactive to light. Conjunctivae/corneas clear. Neck: Supple, with full range of motion. No jugular venous distention. Trachea midline. Respiratory:  Normal respiratory effort. Clear to auscultation, bilaterally without Rales/Wheezes/Rhonchi. Cardiovascular: Regular rate and rhythm with normal S1/S2 without murmurs, rubs or gallops. Abdomen: Soft, non-tender, non-distended with normal bowel sounds. Musculoskeletal: No clubbing, cyanosis or edema bilaterally. Full range of motion without deformity. Skin: Skin color, texture, turgor normal.  No rashes or lesions. Neurologic:  Neurovascularly intact without any focal sensory/motor deficits.    Psychiatric: Alert and oriented, thought content appropriate, normal insight  Capillary Refill: Brisk,< 3 seconds   Peripheral Pulses: +2 palpable, equal bilaterally       Labs:   Recent Labs     10/24/20  0806   WBC 8.4   HGB 13.2   HCT 40.6        Recent Labs     10/24/20  0806 10/25/20  0545    141   K 4.3 4.1   CL 98 105   CO2 24 23   BUN 25* 18   CREATININE 0.8 0.8   CALCIUM 9.2 8.6     No results for input(s): AST, ALT, BILIDIR, BILITOT, ALKPHOS in the last 72 hours. No results for input(s): INR in the last 72 hours. Recent Labs     10/24/20  0806   TROPONINI <0.01     CT HEAD WO CONTRAST   Final Result   No acute intracranial abnormality is identified. XR CHEST PORTABLE   Final Result   No acute process.          US CAROTID ARTERY BILATERAL    (Results Pending)       Assessment/Plan:    Active Hospital Problems    Diagnosis Date Noted    Dehydration [E86.0] 10/24/2020    Morbid obesity with BMI of 40.0-44.9, adult ([facility] [address] [E66.01, Z68.41] 10/24/2020    Type 2 diabetes mellitus, without long-term current use of insulin ([facility] [address] [E11.9] 10/24/2020    Syncope and collapse [R55] 10/24/2020     Plan  BGL management  Neurovascular checks  Consult ENT  Echo  30 day event monitor if workup negative  Follow up with cardiology in 4 weeks    DVT Prophylaxis: lovenox  Diet: DIET CARB CONTROL;  Code Status: Full Code    PT/OT Eval Status: n/a    Dispo - observation    Margarita Rodriguez, TAMMI

## 2020-10-26 VITALS
WEIGHT: 222.4 LBS | HEIGHT: 62 IN | TEMPERATURE: 97.8 F | HEART RATE: 59 BPM | OXYGEN SATURATION: 96 % | SYSTOLIC BLOOD PRESSURE: 127 MMHG | DIASTOLIC BLOOD PRESSURE: 75 MMHG | RESPIRATION RATE: 18 BRPM | BODY MASS INDEX: 40.93 KG/M2

## 2020-10-26 LAB — METER GLUCOSE: 173 MG/DL (ref 74–99)

## 2020-10-26 PROCEDURE — G0378 HOSPITAL OBSERVATION PER HR: HCPCS

## 2020-10-26 PROCEDURE — 6370000000 HC RX 637 (ALT 250 FOR IP): Performed by: CLINICAL NURSE SPECIALIST

## 2020-10-26 PROCEDURE — 82962 GLUCOSE BLOOD TEST: CPT

## 2020-10-26 PROCEDURE — 93308 TTE F-UP OR LMTD: CPT

## 2020-10-26 PROCEDURE — 2580000003 HC RX 258: Performed by: CLINICAL NURSE SPECIALIST

## 2020-10-26 RX ORDER — SULFAMETHOXAZOLE AND TRIMETHOPRIM 800; 160 MG/1; MG/1
1 TABLET ORAL EVERY 12 HOURS SCHEDULED
Qty: 20 TABLET | Refills: 0 | Status: CANCELLED | OUTPATIENT
Start: 2020-10-26 | End: 2020-11-05

## 2020-10-26 RX ORDER — MECLIZINE HCL 12.5 MG/1
12.5 TABLET ORAL 3 TIMES DAILY PRN
Qty: 30 TABLET | Refills: 0 | Status: SHIPPED | OUTPATIENT
Start: 2020-10-26 | End: 2020-11-05

## 2020-10-26 RX ORDER — SULFAMETHOXAZOLE AND TRIMETHOPRIM 800; 160 MG/1; MG/1
1 TABLET ORAL EVERY 12 HOURS SCHEDULED
Status: DISCONTINUED | OUTPATIENT
Start: 2020-10-26 | End: 2020-10-26 | Stop reason: HOSPADM

## 2020-10-26 RX ADMIN — Medication 10 ML: at 09:54

## 2020-10-26 RX ADMIN — HYDROCHLOROTHIAZIDE 25 MG: 25 TABLET ORAL at 09:54

## 2020-10-26 RX ADMIN — VERAPAMIL HYDROCHLORIDE 180 MG: 180 TABLET, FILM COATED, EXTENDED RELEASE ORAL at 09:54

## 2020-10-26 RX ADMIN — VALSARTAN 320 MG: 320 TABLET, FILM COATED ORAL at 09:54

## 2020-10-26 ASSESSMENT — PAIN SCALES - GENERAL: PAINLEVEL_OUTOF10: 0

## 2020-10-26 NOTE — DISCHARGE SUMMARY
Hospital Medicine Discharge Summary    Patient: Alberto Rooney     Gender: female  : 1960   Age: 61 y.o. MRN: [id number]    Admitting Physician: Aysha Maldonado MD  Discharge Physician: CLAUDIO Mccabe, CNP    Code Status: Full Code     Admit Date: 10/24/2020   Discharge Date:   10/26/20     Disposition:  Home    Discharge Diagnoses: Active Hospital Problems    Diagnosis Date Noted    Dehydration [E86.0] 10/24/2020    Morbid obesity with BMI of 40.0-44.9, adult ([facility] [address] [E66.01, Z68.41] 10/24/2020    Type 2 diabetes mellitus, without long-term current use of insulin ([facility] [address] [E11.9] 10/24/2020    Syncope and collapse [R55] 10/24/2020       Follow-up appointments:  one week    Outpatient to do list: follow up    Condition at Discharge:  [facility] [place]:   61 y.o. female with history of DM and HTN who presented to [address] [place] [address] with syncope. History obtained from the patient. She states she woke this morning, got up to go to the bathroom and upon returning to bed, became dizzy. She states she lied back down and when she attempted to get out of bed again, she sat up and passed out. She states this happened twice before she told her  that something was wrong. He checked her BGL and BP then called EMS. She states she passed out again when trying to get up to walk to the [facility]. In the ED, she received 2 L IV NS. She states she is still feeling dizzy when she sits up. She denies having any symptoms prior to today. States she drinks two very large cups of water a day. Denies urinary symptoms. States her blood sugars have been a little high but not terrible. She denies palpitations, fluttering in her chest, vision changes, fever or chills. 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    Orthostatics not completed in the ED d/t patient having dizziness with position changes. In addition the the 2 L bolus, she received rocephin for possible UTI. EKG is NSR with no acute changes.   Carotid US reveals 0-49% stenosis bilaterally. Seen by ENT, to follow up in office. To follow up with cardiology for 30 day event monitor at discharge. Discharge Medications:   Current Discharge Medication List        Current Discharge Medication List        Current Discharge Medication List      CONTINUE these medications which have NOT CHANGED    Details   metFORMIN (GLUCOPHAGE-XR) 500 MG extended release tablet Take 1,000 mg by mouth Daily with supper       SITagliptin (JANUVIA) 25 MG tablet Take 25 mg by mouth daily      Multiple Vitamins-Minerals (THERAPEUTIC MULTIVITAMIN-MINERALS) tablet Take 1 tablet by mouth daily      loratadine (CLARITIN) 10 MG tablet Take 10 mg by mouth daily      valsartan-hydrochlorothiazide (DIOVAN HCT) 320-25 MG per tablet Take 1 tablet by mouth daily      verapamil (VERELAN) 180 MG extended release capsule Take 180 mg by mouth daily            Current Discharge Medication List      STOP taking these medications       metFORMIN (GLUCOPHAGE) 500 MG tablet Comments:   Reason for Stopping:         naproxen (NAPROSYN) 500 MG tablet Comments:   Reason for Stopping:               Discharge ROS:  A complete review of systems was asked and negative     Discharge Exam:    /75   Pulse 59   Temp 97.8 °F (36.6 °C) (Temporal)   Resp 18   Ht 5' 1.5\" (1.562 m)   Wt 222 lb 12.8 oz (101.1 kg)   SpO2 96%   BMI 41.42 kg/m²   General appearance:  NAD  HEENT:   Normal cephalic, atraumatic, moist mucous membranes, no oropharyngeal erythema or exudate  Neck: Supple, trachea midline, no anterior cervical or SC LAD  Heart[de-identified] Normal s1/s2, RRR, no murmurs, gallops, or rubs. no leg edema  Lungs:  clear bilaterally, no wheeze, no rales or rhonchi, no use of accessory musclesNormal respiratory effort.  Clear to auscultation, bilaterally without Rales/Wheezes/Rhonchi. Abdomen: Soft, non-tender, non-distended, bowel sounds present, no masses  Musculoskeletal:  No clubbing, no cyanosis, no edema  Skin: No lesion or masses  Neurologic:  Neurovascularly intact without any focal sensory/motor deficits. Psychiatric:  A & O x3  Neuro: Grossly intact, moves all four extremities     Labs: For convenience and continuity at follow-up the following most recent labs are provided:    Lab Results   Component Value Date    WBC 8.4 10/24/2020    HGB 13.2 10/24/2020    HCT 40.6 10/24/2020    MCV 93.3 10/24/2020     10/24/2020     10/25/2020    K 4.1 10/25/2020     10/25/2020    CO2 23 10/25/2020    BUN 18 10/25/2020    CREATININE 0.8 10/25/2020    CALCIUM 8.6 10/25/2020     No results found for: INR    Radiology:  Ct Head Wo Contrast    Result Date: 10/24/2020  EXAMINATION: CT OF THE HEAD WITHOUT CONTRAST  10/24/2020 9:13 am TECHNIQUE: CT of the head was performed without the administration of intravenous contrast. Dose modulation, iterative reconstruction, and/or weight based adjustment of the mA/kV was utilized to reduce the radiation dose to as low as reasonably achievable. COMPARISON: 02/22/2018 HISTORY: ORDERING SYSTEM PROVIDED HISTORY: syncope TECHNOLOGIST PROVIDED HISTORY: Has a \"code stroke\" or \"stroke alert\" been called? ->No Reason for exam:->syncope What reading provider will be dictating this exam?->CRC FINDINGS: BRAIN/VENTRICLES: There is no acute intracranial hemorrhage, mass effect or midline shift. No abnormal extra-axial fluid collection. The gray-white differentiation is maintained without evidence of an acute infarct. There is no evidence of hydrocephalus. No wedge-shaped area of acute ischemia is identified. No intracranial mass is identified. No basilar cistern or sulcal effacement is identified. Minimal atherosclerotic plaque noted within the left vertebral artery.   Tiny lacunar infarct noted adjacent to the frontal horn of the left lateral ventricle. ORBITS: The visualized portion of the orbits demonstrate no acute abnormality. SINUSES: The visualized paranasal sinuses and mastoid air cells demonstrate no acute abnormality. SOFT TISSUES/SKULL:  No acute abnormality of the visualized skull or soft tissues. No acute intracranial abnormality is identified. Xr Chest Portable    Result Date: 10/24/2020  EXAMINATION: ONE XRAY VIEW OF THE CHEST 10/24/2020 8:14 am COMPARISON: None. HISTORY: ORDERING SYSTEM PROVIDED HISTORY: syncope TECHNOLOGIST PROVIDED HISTORY: Reason for exam:->syncope What reading provider will be dictating this exam?->CRC Initial exam FINDINGS: The lungs are without acute focal process. There is no effusion or pneumothorax. The cardiomediastinal silhouette is without acute process. The osseous structures are without acute process. No acute process. Us Carotid Artery Bilateral    Result Date: 10/25/2020  Patient MRN:  [id number] : 1960 Age: 61 years Gender: Female Order Date:  10/25/2020 9:10 AM EXAM: US CAROTID ARTERY BILATERAL NUMBER OF IMAGES:  55 INDICATION:  syncope syncope What reading provider will be dictating this exam?->[facility] COMPARISON: None FINDINGS: Velocities are within normal limits ICA\CCA ratios are  within normal limits The right vertebral artery doppler images demonstrate  antegrade flow. The left vertebral artery doppler images demonstrate  antegrade flow. Grey scale images demonstrate mild plaque identified in the right and left carotid arteries. Atherosclerotic disease. No hemodynamically significant stenosis is identified Estimated stenosis by NASCET criteria in the proximal right carotid artery is between 0% and 49%. Estimated stenosis by NASCET criteria in the proximal left carotid artery is between 0% and 49%.        EKG     Rhythm: normal sinus   Rate: normal  Clinical Impression: non-specific EKG        The patient was seen and examined on day of discharge and this discharge summary is in conjunction with any daily progress note from day of discharge. Time Spent on discharge is 30 minutes  in the examination, evaluation, counseling and review of medications and discharge plan. Note that more than 30 minutes was spent in preparing discharge papers, discussing discharge with patient, medication review, etc.       Signed:    CLAUDIO Starks, CNP   10/26/2020      Thank you Nena Mosley DO for the opportunity to be involved in this patient's care.  If you have any questions or concerns please feel free to contact me at [phone]

## 2020-10-27 LAB
ORGANISM: ABNORMAL
ORGANISM: ABNORMAL
URINE CULTURE, ROUTINE: ABNORMAL

## 2020-10-29 ENCOUNTER — TELEPHONE (OUTPATIENT)
Dept: CARDIOLOGY CLINIC | Age: 60
End: 2020-10-29

## 2020-10-29 NOTE — TELEPHONE ENCOUNTER
Patient left message,  [employer] Deras [id number]/6126 [phone] (home)    Was instructed to call office for a cardiac monitor   She was discharged from hospital [facility]      30  Day event monitor

## 2020-10-29 NOTE — PROGRESS NOTES
Enrolled in [address] [place] [address] [phone] (home)      800 # in box for tech support/ assistance. Instruction in box  Monitor will be mailed to you via  UPS    Apt set with Dr Laine Castillo.   Also mailed

## 2020-10-30 ENCOUNTER — TELEPHONE (OUTPATIENT)
Dept: ADMINISTRATIVE | Age: 60
End: 2020-10-30

## 2020-10-30 NOTE — TELEPHONE ENCOUNTER
Patient called in today would like to be seen asap. Next available oferred to patient but states she needs to get back to work and needs seen asap. Has seen Dr. Mynor Cardozo but he cannot get her in for her vestibular test sooner than the end of november. Patient informed a PE would be sent to the office to determine if she can be seen sooner.  Best call back is [phone]

## 2020-10-30 NOTE — TELEPHONE ENCOUNTER
Called patient and advised we do not have any openings until mid November and vestibular testing is scheduling at the end of December. Pt stated she will keep her apt with dr Roberta Grady in [address] [place] [address].

## 2020-11-23 PROBLEM — E86.0 DEHYDRATION: Status: RESOLVED | Noted: 2020-10-24 | Resolved: 2020-11-23
